# Patient Record
Sex: FEMALE | URBAN - METROPOLITAN AREA
[De-identification: names, ages, dates, MRNs, and addresses within clinical notes are randomized per-mention and may not be internally consistent; named-entity substitution may affect disease eponyms.]

---

## 2020-03-15 ENCOUNTER — TELEPHONE (OUTPATIENT)
Dept: OTHER | Age: 40
End: 2020-03-15

## 2020-03-15 ENCOUNTER — NURSE TRIAGE (OUTPATIENT)
Dept: OTHER | Age: 40
End: 2020-03-15

## 2020-08-17 ENCOUNTER — TELEPHONE (OUTPATIENT)
Dept: OTHER | Age: 40
End: 2020-08-17

## 2021-03-17 ENCOUNTER — NURSE TRIAGE (OUTPATIENT)
Dept: OTHER | Age: 41
End: 2021-03-17

## 2021-09-17 ENCOUNTER — TELEPHONE (OUTPATIENT)
Dept: CARDIOLOGY CLINIC | Age: 41
End: 2021-09-17

## 2022-01-19 ENCOUNTER — NURSE TRIAGE (OUTPATIENT)
Dept: OTHER | Age: 42
End: 2022-01-19

## 2022-01-19 NOTE — TELEPHONE ENCOUNTER
Reason for Disposition   [1] Prescription refill request for NON-ESSENTIAL medicine (i.e., no harm to patient if med not taken) AND [2] triager unable to refill per department policy    Answer Assessment - Initial Assessment Questions  1. DRUG NAME: \"What medicine do you need to have refilled? \"      Lipitor  2. REFILLS REMAINING: \"How many refills are remaining? \" (Note: The label on the medicine or pill bottle will show how many refills are remaining. If there are no refills remaining, then a renewal may be needed.)  2 refills  3. EXPIRATION DATE: Evone Randy is the expiration date? \" (Note: The label states when the prescription will , and thus can no longer be refilled.)   Unsure  4. PRESCRIBING HCP: \"Who prescribed it? \" Reason: If prescribed by specialist, call should be referred to that group. Dr Cantu  5. SYMPTOMS: \"Do you have any symptoms? \"    Denies  6. PREGNANCY: \"Is there any chance that you are pregnant? \" \"When was your last menstrual period? \"   n/a    Protocols used: MEDICATION REFILL AND RENEWAL CALL-ADULT-

## 2022-01-19 NOTE — TELEPHONE ENCOUNTER
Reason for Disposition   [1] Prescription refill request for NON-ESSENTIAL medicine (i.e., no harm to patient if med not taken) AND [2] triager unable to refill per department policy    Answer Assessment - Initial Assessment Questions  1. NAUSEA SEVERITY: \"How bad is the nausea? \" (e.g., mild, moderate, severe; dehydration, weight loss)    - MILD: loss of appetite without change in eating habits    - MODERATE: decreased oral intake without significant weight loss, dehydration, or malnutrition    - SEVERE: inadequate caloric or fluid intake, significant weight loss, symptoms of dehydration      Moderate  2. ONSET: \"When did the nausea begin? \"      Onset approx 24 hrs ago  3. VOMITING: \"Any vomiting? \" If Yes, ask: \"How many times today? \"      Denies vomiting  4. RECURRENT SYMPTOM: \"Have you had nausea before? \" If Yes, ask: \"When was the last time? \" \"What happened that time? \"      Previous feeling of nausea after eating greasy food  5. CAUSE: \"What do you think is causing the nausea? \"      Recently consumed deep fried fish  6. PREGNANCY: \"Is there any chance you are pregnant? \" (e.g., unprotected intercourse, missed birth control pill, broken condom)      n/a    Answer Assessment - Initial Assessment Questions  1. DRUG NAME: \"What medicine do you need to have refilled? \"      Flonase  2. REFILLS REMAINING: \"How many refills are remaining? \" (Note: The label on the medicine or pill bottle will show how many refills are remaining. If there are no refills remaining, then a renewal may be needed.)  None  3. EXPIRATION DATE: Shay Reveal is the expiration date? \" (Note: The label states when the prescription will , and thus can no longer be refilled.)   2021  4. PRESCRIBING HCP: \"Who prescribed it? \" Reason: If prescribed by specialist, call should be referred to that group. Dr Mark Hernandez  5. SYMPTOMS: \"Do you have any symptoms? \"      Runny nose  6. PREGNANCY: \"Is there any chance that you are pregnant? \" \"When was your last

## 2022-01-28 ENCOUNTER — NURSE TRIAGE (OUTPATIENT)
Dept: OTHER | Age: 42
End: 2022-01-28
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
## 2022-03-08 ENCOUNTER — TELEPHONE (OUTPATIENT)
Dept: OTHER | Age: 42
End: 2022-03-08

## 2024-06-14 DIAGNOSIS — G47.33 OSA (OBSTRUCTIVE SLEEP APNEA): Primary | ICD-10-CM
